# Patient Record
Sex: MALE | Race: AMERICAN INDIAN OR ALASKA NATIVE
[De-identification: names, ages, dates, MRNs, and addresses within clinical notes are randomized per-mention and may not be internally consistent; named-entity substitution may affect disease eponyms.]

---

## 2018-07-16 ENCOUNTER — HOSPITAL ENCOUNTER (EMERGENCY)
Dept: HOSPITAL 41 - JD.ED | Age: 68
Discharge: NURSING, REHAB OR LONG-TERM CARE | End: 2018-07-16
Payer: MEDICARE

## 2018-07-16 DIAGNOSIS — I21.4: Primary | ICD-10-CM

## 2018-07-16 DIAGNOSIS — E11.9: ICD-10-CM

## 2018-07-16 DIAGNOSIS — Z88.1: ICD-10-CM

## 2018-07-16 PROCEDURE — 85025 COMPLETE CBC W/AUTO DIFF WBC: CPT

## 2018-07-16 PROCEDURE — 96375 TX/PRO/DX INJ NEW DRUG ADDON: CPT

## 2018-07-16 PROCEDURE — 82553 CREATINE MB FRACTION: CPT

## 2018-07-16 PROCEDURE — 71275 CT ANGIOGRAPHY CHEST: CPT

## 2018-07-16 PROCEDURE — 71045 X-RAY EXAM CHEST 1 VIEW: CPT

## 2018-07-16 PROCEDURE — 36415 COLL VENOUS BLD VENIPUNCTURE: CPT

## 2018-07-16 PROCEDURE — 85379 FIBRIN DEGRADATION QUANT: CPT

## 2018-07-16 PROCEDURE — 99285 EMERGENCY DEPT VISIT HI MDM: CPT

## 2018-07-16 PROCEDURE — 96368 THER/DIAG CONCURRENT INF: CPT

## 2018-07-16 PROCEDURE — 85730 THROMBOPLASTIN TIME PARTIAL: CPT

## 2018-07-16 PROCEDURE — 80053 COMPREHEN METABOLIC PANEL: CPT

## 2018-07-16 PROCEDURE — 93005 ELECTROCARDIOGRAM TRACING: CPT

## 2018-07-16 PROCEDURE — 96365 THER/PROPH/DIAG IV INF INIT: CPT

## 2018-07-16 PROCEDURE — 96376 TX/PRO/DX INJ SAME DRUG ADON: CPT

## 2018-07-16 PROCEDURE — 85610 PROTHROMBIN TIME: CPT

## 2018-07-16 PROCEDURE — 84484 ASSAY OF TROPONIN QUANT: CPT

## 2018-07-16 PROCEDURE — 83880 ASSAY OF NATRIURETIC PEPTIDE: CPT

## 2018-07-16 NOTE — CT
CT chest

 

Technique: Multiple axial sections were obtained through the chest as 

a pulmonary angiogram protocol.  Intravenous contrast was therefore 

utilized.

 

Comparison: Prior CT chest performed as a high-resolution protocol is 

available.

 

Findings: Slight fibrosis is again seen within the periphery of both 

lungs.  No acute parenchymal densities are seen.  Pulmonary arteries 

are well opacified.  No filling defects are seen to indicate pulmonary

 embolism.  Mediastinum and hilar regions show no adenopathy or mass. 

 Prominent coronary artery calcification is noted.  Small portion of 

the visualized upper abdominal structures are felt to be within normal

 limits.

 

Bone window settings were reviewed which shows diffuse degenerative 

change within the spine.  Several levels of disc calcification are 

seen.

 

Impression:

1.  No findings of pulmonary embolism.

2.  Other incidental findings as noted above.

 

Diagnostic code #2

## 2018-07-16 NOTE — EDM.PDOC
ED HPI GENERAL MEDICAL PROBLEM





- General


Chief Complaint: Chest Pain


Stated Complaint: TROUBLE BREATHING/CHEST PAIN


Time Seen by Provider: 07/16/18 18:30


Source of Information: Reports: Patient


History Limitations: Reports: No Limitations





- History of Present Illness


INITIAL COMMENTS - FREE TEXT/NARRATIVE: 





68-year-old male presents for evaluation and treatment of chest pain. Patient 

reports the chest pain has been going on for several weeks. States is located 

in the substernal area and he has appreciated some radiation up into his jaw. 

Reports that last night was the worst at that point the pain was a 7 or 8 out 

of 10. Currently chest pain as a 1 or 2 out of 10. He declines any pain 

medication for chest pain at this time. States last night he had trouble 

sleeping and getting comfortable. Exertion seems to worsen the chest pain. He 

is reporting associated symptoms of shortness of breath and dyspnea on 

exertion. He denies any recent fevers or cold symptoms. Reports that he has a 

chronic cough which she has been seen for. Denies any syncope, pain or swelling 

in his legs, abdominal pain, nausea or vomiting.





Patient rep he is a diabetic currently on metformin and insulin both short and 

long-acting. He also has a history of hypertension and is obese.





Patient reports he was no an [place] cruise the beginning of July. 





He is not a smoker.





No cardiac history himself. No history of any MIs or arrhythmias.


Location: Reports: Chest


  ** Chest


Pain Score (Numeric/FACES): 1





- Related Data


 Allergies











Allergy/AdvReac Type Severity Reaction Status Date / Time


 


levofloxacin [From Levaquin] Allergy  Shortness Verified 07/16/18 18:24





   of Breath  














Past Medical History


Cardiovascular History: Reports: Hypertension


Endocrine/Metabolic History: Reports: Diabetes, Type II





Social & Family History





- Tobacco Use


Smoking Status *Q: Never Smoker





- Recreational Drug Use


Recreational Drug Use: No





ED ROS GENERAL





- Review of Systems


Review Of Systems: See Below


Constitutional: Denies: Fever, Diaphoresis


Respiratory: Reports: Shortness of Breath, Cough (chronic)


Cardiovascular: Reports: Chest Pain, Dyspnea on Exertion, Lightheadedness.  

Denies: Edema, Syncope


GI/Abdominal: Denies: Abdominal Pain, Nausea, Vomiting


Musculoskeletal: Denies: Leg Pain


Neurological: Denies: Syncope





ED EXAM, GENERAL





- Physical Exam


Exam: See Below


Exam Limited By: No Limitations


General Appearance: Alert, WD/WN, No Apparent Distress


Ears: Normal External Exam


Nose: Normal Inspection


Throat/Mouth: Normal Inspection, Normal Voice, No Airway Compromise


Respiratory/Chest: No Respiratory Distress, Lungs Clear, Normal Breath Sounds


Cardiovascular: No Murmur, Tachycardia


GI/Abdominal: Soft, Non-Tender


Neurological: Alert, Oriented, Normal Cognition


Psychiatric: Normal Affect, Normal Mood


Skin Exam: Warm, Dry, Normal Color





EKG INTERPRETATION


EKG Date: 07/16/18


Time: 18:20


Rhythm: Other (sinus tachycardia at 115 bpm.)


Rate (Beats/Min): 115


Axis: RAD-Right Axis Deviation


P-Wave: Present


QRS: Normal


ST-T: Normal


QT: Normal


EKG Interpretation Comments: 





Sinus tachycardia at 115 bpm. Inverted t waves in the inferior leads. No St 

elevation. Reviewed by myself and Dr. Cool. 





Course





- Vital Signs


Last Recorded V/S: 


 Last Vital Signs











Temp  97.2 F   07/16/18 18:24


 


Pulse  104 H  07/16/18 20:10


 


Resp  26 H  07/16/18 18:24


 


BP  139/95 H  07/16/18 20:10


 


Pulse Ox  96   07/16/18 18:24














- Orders/Labs/Meds


Orders: 


 Active Orders 24 hr











 Category Date Time Status


 


 Cardiac Monitoring [RC] .AS DIRECTED Care  07/16/18 18:39 Active


 


 EKG Documentation Completion [RC] ASDIRECTED Care  07/16/18 18:35 Active


 


 Peripheral IV Care [RC] .AS DIRECTED Care  07/16/18 18:36 Active


 


 Chest 1V Frontal [CR] Stat Exams  07/16/18 18:34 Taken


 


 Chest PE [Ang Chest] [CT] Stat Exams  07/16/18 19:03 Taken


 


 Heparin Sodium/D5W [Heparin 25,000 Units in D5W 500 ML] Med  07/16/18 19:30 

Active





 25,000 units in 500 ml IV TITRATE   


 


 Nitroglycerin/D5W [Nitroglycerin  25 MG/D5W 250 ML] Med  07/16/18 19:30 Active





 25 mg in 250 ml IV TITRATE   


 


 Sodium Chloride 0.9% [Saline Flush] Med  07/16/18 18:36 Active





 10 ml FLUSH ASDIRECTED PRN   


 


 Peripheral IV Insertion Adult [OM.PC] Routine Oth  07/16/18 18:36 Ordered


 


 EKG 12 Lead [EK] Stat Ther  07/16/18 18:34 Ordered








 Medication Orders





Heparin Sodium/Dextrose (Heparin 25,000 Units In D5w 500 Ml)  25,000 units in 

500 mls @ 20 mls/hr IV TITRATE TISH


   Last Admin: 07/16/18 19:46  Dose: 1,000 units/hr, 20 mls/hr


Nitroglycerin/Dextrose (Nitroglycerin  25 Mg/D5w 250 Ml)  25 mg in 250 mls @ 3 

mls/hr IV TITRATE TISH; Protocol


   Last Admin: 07/16/18 19:46  Dose: 5 mcg/min, 3 mls/hr


Sodium Chloride (Saline Flush)  10 ml FLUSH ASDIRECTED PRN


   PRN Reason: Keep Vein Open


   Last Admin: 07/16/18 18:39  Dose: 10 ml








Labs: 


 Laboratory Tests











  07/16/18 07/16/18 07/16/18 Range/Units





  18:30 18:30 18:30 


 


WBC  12.78 H    (4.23-9.07)  K/mm3


 


RBC  5.47    (4.63-6.08)  M/mm3


 


Hgb  16.2    (13.7-17.5)  gm/L


 


Hct  47.5    (40.1-51.0)  %


 


MCV  86.8    (79.0-92.2)  fl


 


MCH  29.6    (25.7-32.2)  pg


 


MCHC  34.1    (32.2-35.5)  g/dl


 


RDW Std Deviation  42.0    (35.1-43.9)  fL


 


Plt Count  244    (163-337)  K/mm3


 


MPV  10.0    (9.4-12.3)  fl


 


Neut % (Auto)  80.3 H    (34.0-67.9)  %


 


Lymph % (Auto)  9.0 L    (21.8-53.1)  %


 


Mono % (Auto)  9.7    (5.3-12.2)  %


 


Eos % (Auto)  0.6 L    (0.8-7.0)  


 


Baso % (Auto)  0.2    (0.1-1.2)  %


 


Neut # (Auto)  10.26 H    (1.78-5.38)  K/mm3


 


Lymph # (Auto)  1.15 L    (1.32-3.57)  K/mm3


 


Mono # (Auto)  1.24 H    (0.30-0.82)  K/mm3


 


Eos # (Auto)  0.08    (0.04-0.54)  K/mm3


 


Baso # (Auto)  0.02    (0.01-0.08)  K/mm3


 


Manual Slide Review  Normal smear    


 


PT   10.3   (9.5-12.1)  SECONDS


 


INR   0.94   


 


APTT   27   (24-31)  SECONDS


 


D-Dimer, Quantitative   2.16 H   (0.19-0.50)  mg/L


 


Sodium    136  (136-145)  mEq/L


 


Potassium    4.1  (3.5-5.1)  mEq/L


 


Chloride    101  ()  mEq/L


 


Carbon Dioxide    26  (21-32)  mEq/L


 


Anion Gap    13.1  (5-15)  


 


BUN    18  (7-18)  mg/dL


 


Creatinine    1.1  (0.7-1.3)  mg/dL


 


Est Cr Clr Drug Dosing    77.81  mL/min


 


Estimated GFR (MDRD)    > 60  (>60)  mL/min


 


BUN/Creatinine Ratio    16.4  (14-18)  


 


Glucose    201 H  ()  mg/dL


 


Calcium    9.5  (8.5-10.1)  mg/dL


 


Total Bilirubin    0.5  (0.2-1.0)  mg/dL


 


AST    119 H  (15-37)  U/L


 


ALT    42  (16-63)  U/L


 


Alkaline Phosphatase    107  ()  U/L


 


CK-MB (CK-2)    108.6 H  (0-3.6)  ng/ml


 


Troponin I    12.879 H*  (0.00-0.056)  ng/mL


 


NT-Pro-B Natriuret Pep     (0-125)  pg/mL


 


Total Protein    8.3 H  (6.4-8.2)  g/dl


 


Albumin    3.7  (3.4-5.0)  g/dl


 


Globulin    4.6  gm/dL


 


Albumin/Globulin Ratio    0.8 L  (1-2)  














  07/16/18 Range/Units





  18:30 


 


WBC   (4.23-9.07)  K/mm3


 


RBC   (4.63-6.08)  M/mm3


 


Hgb   (13.7-17.5)  gm/L


 


Hct   (40.1-51.0)  %


 


MCV   (79.0-92.2)  fl


 


MCH   (25.7-32.2)  pg


 


MCHC   (32.2-35.5)  g/dl


 


RDW Std Deviation   (35.1-43.9)  fL


 


Plt Count   (163-337)  K/mm3


 


MPV   (9.4-12.3)  fl


 


Neut % (Auto)   (34.0-67.9)  %


 


Lymph % (Auto)   (21.8-53.1)  %


 


Mono % (Auto)   (5.3-12.2)  %


 


Eos % (Auto)   (0.8-7.0)  


 


Baso % (Auto)   (0.1-1.2)  %


 


Neut # (Auto)   (1.78-5.38)  K/mm3


 


Lymph # (Auto)   (1.32-3.57)  K/mm3


 


Mono # (Auto)   (0.30-0.82)  K/mm3


 


Eos # (Auto)   (0.04-0.54)  K/mm3


 


Baso # (Auto)   (0.01-0.08)  K/mm3


 


Manual Slide Review   


 


PT   (9.5-12.1)  SECONDS


 


INR   


 


APTT   (24-31)  SECONDS


 


D-Dimer, Quantitative   (0.19-0.50)  mg/L


 


Sodium   (136-145)  mEq/L


 


Potassium   (3.5-5.1)  mEq/L


 


Chloride   ()  mEq/L


 


Carbon Dioxide   (21-32)  mEq/L


 


Anion Gap   (5-15)  


 


BUN   (7-18)  mg/dL


 


Creatinine   (0.7-1.3)  mg/dL


 


Est Cr Clr Drug Dosing   mL/min


 


Estimated GFR (MDRD)   (>60)  mL/min


 


BUN/Creatinine Ratio   (14-18)  


 


Glucose   ()  mg/dL


 


Calcium   (8.5-10.1)  mg/dL


 


Total Bilirubin   (0.2-1.0)  mg/dL


 


AST   (15-37)  U/L


 


ALT   (16-63)  U/L


 


Alkaline Phosphatase   ()  U/L


 


CK-MB (CK-2)   (0-3.6)  ng/ml


 


Troponin I   (0.00-0.056)  ng/mL


 


NT-Pro-B Natriuret Pep  1952 H  (0-125)  pg/mL


 


Total Protein   (6.4-8.2)  g/dl


 


Albumin   (3.4-5.0)  g/dl


 


Globulin   gm/dL


 


Albumin/Globulin Ratio   (1-2)  











Meds: 


Medications











Generic Name Dose Route Start Last Admin





  Trade Name Frevahid  PRN Reason Stop Dose Admin


 


Heparin Sodium/Dextrose  25,000 units in 500 mls @ 20 mls/hr  07/16/18 19:30  07 /16/18 19:46





  Heparin 25,000 Units In D5w 500 Ml  IV   1,000 units/hr





  TITRATE TISH   20 mls/hr





     Administration





     





     





  1,000 UNITS/HR   


 


Nitroglycerin/Dextrose  25 mg in 250 mls @ 3 mls/hr  07/16/18 19:30  07/16/18 19

:46





  Nitroglycerin  25 Mg/D5w 250 Ml  IV   5 mcg/min





  TITRATE TISH   3 mls/hr





     Administration





     





  Protocol   





  5 MCG/MIN   


 


Sodium Chloride  10 ml  07/16/18 18:36  07/16/18 18:39





  Saline Flush  FLUSH   10 ml





  ASDIRECTED PRN   Administration





  Keep Vein Open   





     





     





     














Discontinued Medications














Generic Name Dose Route Start Last Admin





  Trade Name Eugenio  PRN Reason Stop Dose Admin


 


Aspirin  324 mg  07/16/18 18:34  07/16/18 18:39





  Aspirin  PO  07/16/18 18:35  324 mg





  ONETIME ONE   Administration





     





     





     





     


 


Heparin Sodium (Porcine)  5,000 units  07/16/18 19:23  07/16/18 19:44





  Heparin Sodium  IVPUSH  07/16/18 19:24  5,000 units





  ONETIME ONE   Administration





     





     





     





     


 


Hydromorphone HCl  0.5 mg  07/16/18 20:02  07/16/18 20:16





  Dilaudid  IVPUSH  07/16/18 20:03  0.5 mg





  ONETIME ONE   Administration





     





     





     





     


 


Nitroglycerin/Dextrose  Confirm  07/16/18 19:30  07/16/18 19:50





  Nitroglycerin  25 Mg/D5w 250 Ml  Administered  07/16/18 19:31  Not Given





  Dose   





  25 mg in 250 mls @ as directed   





  .ROUTE   





  .STK-MED ONE   





     





     





     





     


 


Heparin Sodium/Dextrose  Confirm  07/16/18 19:30  07/16/18 19:50





  Heparin 25,000 Units In D5w 500 Ml  Administered  07/16/18 19:31  Not Given





  Dose   





  500 mls @ as directed   





  .ROUTE   





  .STK-MED ONE   





     





     





     





     


 


Iopamidol  100 ml  07/16/18 19:14  07/16/18 19:45





  Isovue-370 (76%)  IVPUSH  07/16/18 19:15  100 ml





  ONETIME ONE   Administration





     





     





     





     


 


Metoprolol Tartrate  5 mg  07/16/18 20:02  07/16/18 20:10





  Lopressor  IVPUSH  07/16/18 20:03  5 mg





  ONETIME ONE   Administration





     





     





     





     














- Radiology Interpretation


Free Text/Narrative:: 





chest xray shows no acute intrathoracic process. 





CT chest





Technique: Multiple axial sections were obtained through the chest as a 

pulmonary angiogram protocol.  Intravenous contrast was therefore utilized.





Comparison: Prior CT chest performed as a high-resolution protocol is available.





Findings: Slight fibrosis is again seen within the periphery of both lungs.  No 

acute parenchymal densities are seen.  Pulmonary arteries are well opacified.  

No filling defects are seen to indicate pulmonary embolism.  Mediastinum and 

hilar regions show no adenopathy or mass.  Prominent coronary artery 

calcification is noted.  Small portion of the visualized upper abdominal 

structures are felt to be within normal limits.





Bone window settings were reviewed which shows diffuse degenerative change 

within the spine.  Several levels of disc calcification are seen.





Impression:


1.  No findings of pulmonary embolism.


2.  Other incidental findings as noted above.





- Re-Assessments/Exams


Free Text/Narrative Re-Assessment/Exam: 





07/16/18 20:29


Reviewed the labs, EKG and imaging with the patient. Once the d-dimer returned 

elevated I ordered a CT pulmonary angiogram exam.





Heparin bolus of 5000 units given as well as a drip started at 1000 units an 

hour given. Nitroglycerin started at 5 g per hour. He was given Lopressor 5 mg 

IV. Blood pressures has come down. He did develop a slight headache from the 

nitroglycerin he was given 0.5 mg Dilaudid for this.





Discussed with him his labs and imaging. He elects to go to [facility] in 

[place].





I discussed the case with Dr. Barlow, hospitalist on call. She asked that we 

obtain the formal read from radiology and if negative for PE he may come to 

[place].





The formal read returned and is negative for any PE. He is having a NSTEMi. 

Will go to [facility] in [place]. He is a direct admission to Dr. Barlow. 





Departure





- Departure


Time of Disposition: 20:34


Disposition: DC/Tfer to Acute Hospital 02


Reason for Transfer *Q: Primary PCI Indicated


Condition: Serious


Clinical Impression: 


 NSTEMI (non-ST elevated myocardial infarction)





Referrals: 


PCP,Not In Area [Primary Care Provider] - 


Amrit Ann MD [Consulting Physician] - 


Forms:  ED Department Discharge


Additional Instructions: 


Patient to go to [facility] in [place]. He will be a direct admission to Dr. Barlow. He'll go by ground ambulance.





- My Orders


Last 24 Hours: 


My Active Orders





07/16/18 18:34


Chest 1V Frontal [CR] Stat 


EKG 12 Lead [EK] Stat 





07/16/18 18:35


EKG Documentation Completion [RC] ASDIRECTED 





07/16/18 18:36


Peripheral IV Care [RC] .AS DIRECTED 


Sodium Chloride 0.9% [Saline Flush]   10 ml FLUSH ASDIRECTED PRN 


Peripheral IV Insertion Adult [OM.PC] Routine 





07/16/18 18:39


Cardiac Monitoring [RC] .AS DIRECTED 





07/16/18 19:03


Chest PE [Ang Chest] [CT] Stat 





07/16/18 19:30


Heparin Sodium/D5W [Heparin 25,000 Units in D5W 500 ML] 25,000 units in 500 ml 

IV TITRATE 


Nitroglycerin/D5W [Nitroglycerin  25 MG/D5W 250 ML] 25 mg in 250 ml IV TITRATE 














- Assessment/Plan


Last 24 Hours: 


My Active Orders





07/16/18 18:34


Chest 1V Frontal [CR] Stat 


EKG 12 Lead [EK] Stat 





07/16/18 18:35


EKG Documentation Completion [RC] ASDIRECTED 





07/16/18 18:36


Peripheral IV Care [RC] .AS DIRECTED 


Sodium Chloride 0.9% [Saline Flush]   10 ml FLUSH ASDIRECTED PRN 


Peripheral IV Insertion Adult [OM.PC] Routine 





07/16/18 18:39


Cardiac Monitoring [RC] .AS DIRECTED 





07/16/18 19:03


Chest PE [Ang Chest] [CT] Stat 





07/16/18 19:30


Heparin Sodium/D5W [Heparin 25,000 Units in D5W 500 ML] 25,000 units in 500 ml 

IV TITRATE 


Nitroglycerin/D5W [Nitroglycerin  25 MG/D5W 250 ML] 25 mg in 250 ml IV TITRATE

## 2018-07-17 NOTE — CR
Chest: Portable view of the chest was obtained.

 

Comparison: No prior chest x-ray.

 

Heart size and mediastinum are within normal limits.  Lungs are clear.

  Bony structures are grossly intact.

 

Impression:

1.  Nothing acute is seen on portable chest x-ray.

 

Diagnostic code #1